# Patient Record
Sex: FEMALE | Race: WHITE | Employment: FULL TIME | URBAN - METROPOLITAN AREA
[De-identification: names, ages, dates, MRNs, and addresses within clinical notes are randomized per-mention and may not be internally consistent; named-entity substitution may affect disease eponyms.]

---

## 2021-09-06 ENCOUNTER — HOSPITAL ENCOUNTER (OUTPATIENT)
Age: 56
Discharge: OTHER INSTITUTION | End: 2021-09-06
Payer: COMMERCIAL

## 2021-09-06 VITALS
HEART RATE: 100 BPM | BODY MASS INDEX: 28.84 KG/M2 | WEIGHT: 206 LBS | HEIGHT: 71 IN | RESPIRATION RATE: 18 BRPM | DIASTOLIC BLOOD PRESSURE: 81 MMHG | SYSTOLIC BLOOD PRESSURE: 127 MMHG | OXYGEN SATURATION: 98 % | TEMPERATURE: 98 F

## 2021-09-06 DIAGNOSIS — R11.2 NON-INTRACTABLE VOMITING WITH NAUSEA, UNSPECIFIED VOMITING TYPE: ICD-10-CM

## 2021-09-06 DIAGNOSIS — R10.9 ABDOMINAL PAIN, ACUTE: Primary | ICD-10-CM

## 2021-09-06 PROCEDURE — 99204 OFFICE O/P NEW MOD 45 MIN: CPT | Performed by: NURSE PRACTITIONER

## 2021-09-06 RX ORDER — METOPROLOL TARTRATE 50 MG/1
TABLET, FILM COATED ORAL
COMMUNITY
Start: 2020-12-16

## 2021-09-06 RX ORDER — BUSPIRONE HYDROCHLORIDE 5 MG/1
TABLET ORAL
COMMUNITY
Start: 2020-08-06

## 2021-09-06 RX ORDER — ANASTROZOLE 1 MG/1
1 TABLET ORAL DAILY
COMMUNITY
Start: 2020-07-14

## 2021-09-06 RX ORDER — ATORVASTATIN CALCIUM 20 MG/1
TABLET, FILM COATED ORAL
COMMUNITY
Start: 2020-10-07

## 2021-09-06 RX ORDER — VENLAFAXINE HYDROCHLORIDE 150 MG/1
150 CAPSULE, EXTENDED RELEASE ORAL DAILY
COMMUNITY

## 2021-09-06 NOTE — ED NOTES
Patient is a 19-year-old female presents to immediate care with a history of abdominal pain, feeling bloated, vomiting, headaches no diarrhea does not know her last bowel movement was.   The patient needs advanced imaging beyond the scope of the immediate c

## 2021-09-06 NOTE — ED PROVIDER NOTES
Patient Seen in: Immediate [facility]      History   Patient presents with:  Abdominal Pain  Headache    Stated Complaint: fever, sweats, abdominal pain, vomiting    HPI/Subjective:   54-year-old female presents today with complaints of diffuse abdomina General: Abdomen is flat. Bowel sounds are normal.      Palpations: Abdomen is soft. Tenderness: There is abdominal tenderness in the right upper quadrant, left upper quadrant and left lower quadrant. There is no guarding or rebound.    Skin:

## 2021-09-06 NOTE — ED INITIAL ASSESSMENT (HPI)
Pt c/o abd pain near her naval for a couple of weeks. She also feels bloated. Two days ago, she vomited x 1 and started having headaches. No diarrhea. Unknown when last bm was. Thinks it may be a few days. No fever. Pt vomited x1 again this morning.